# Patient Record
Sex: MALE | Race: WHITE | Employment: OTHER | URBAN - METROPOLITAN AREA
[De-identification: names, ages, dates, MRNs, and addresses within clinical notes are randomized per-mention and may not be internally consistent; named-entity substitution may affect disease eponyms.]

---

## 2017-01-13 PROCEDURE — 82105 ALPHA-FETOPROTEIN SERUM: CPT | Performed by: UROLOGY

## 2017-01-13 PROCEDURE — 84402 ASSAY OF FREE TESTOSTERONE: CPT | Performed by: UROLOGY

## 2017-01-13 PROCEDURE — 84403 ASSAY OF TOTAL TESTOSTERONE: CPT | Performed by: UROLOGY

## 2017-01-13 PROCEDURE — 84702 CHORIONIC GONADOTROPIN TEST: CPT | Performed by: UROLOGY

## 2017-02-07 PROCEDURE — 82105 ALPHA-FETOPROTEIN SERUM: CPT | Performed by: UROLOGY

## 2017-02-07 PROCEDURE — 36415 COLL VENOUS BLD VENIPUNCTURE: CPT | Performed by: UROLOGY

## 2020-03-02 ENCOUNTER — HOSPITAL ENCOUNTER (OUTPATIENT)
Dept: CT IMAGING | Facility: HOSPITAL | Age: 40
Discharge: HOME | End: 2020-03-02
Attending: UROLOGY
Payer: MEDICAID

## 2020-03-02 DIAGNOSIS — C62.90 SEMINOMA (HCC): ICD-10-CM

## 2020-03-02 DIAGNOSIS — R53.83 FATIGUE, UNSPECIFIED TYPE: ICD-10-CM

## 2020-03-02 LAB — CREAT BLD-MCNC: 1.2 MG/DL (ref 0.7–1.3)

## 2020-03-02 PROCEDURE — 71260 CT THORAX DX C+: CPT | Performed by: UROLOGY

## 2020-03-02 PROCEDURE — 82565 ASSAY OF CREATININE: CPT

## 2020-03-02 PROCEDURE — 74177 CT ABD & PELVIS W/CONTRAST: CPT | Performed by: UROLOGY

## 2020-03-03 NOTE — IMAGING NOTE
Patient was premedicated for previous iodine allergy with steroids and benadryl. After the exam there was an area of raised redness in the area where the tourniquet was. The patient said he get red easily and was not like when he had a previous reaction.  P